# Patient Record
Sex: FEMALE | Race: WHITE | NOT HISPANIC OR LATINO | Employment: OTHER | URBAN - METROPOLITAN AREA
[De-identification: names, ages, dates, MRNs, and addresses within clinical notes are randomized per-mention and may not be internally consistent; named-entity substitution may affect disease eponyms.]

---

## 2017-01-09 ENCOUNTER — TELEPHONE (OUTPATIENT)
Dept: FAMILY MEDICINE | Facility: CLINIC | Age: 67
End: 2017-01-09

## 2017-01-09 NOTE — TELEPHONE ENCOUNTER
Pt has appt sched in February. Pt wants to know if you need labs prior to appt . Pt had labs done in August . Please advise.--lp

## 2017-01-09 NOTE — TELEPHONE ENCOUNTER
----- Message from RT Elan sent at 1/9/2017  9:35 AM CST -----  Contact: pt    pt , requesting labs test orders in prior the her appt of 02/14/2017, please call to confirm, thanks.

## 2017-01-16 RX ORDER — RIVAROXABAN 20 MG/1
TABLET, FILM COATED ORAL
Qty: 90 TABLET | Refills: 0 | Status: SHIPPED | OUTPATIENT
Start: 2017-01-16 | End: 2017-05-18 | Stop reason: SDUPTHER

## 2017-01-31 ENCOUNTER — DOCUMENTATION ONLY (OUTPATIENT)
Dept: ADMINISTRATIVE | Facility: HOSPITAL | Age: 67
End: 2017-01-31

## 2017-01-31 NOTE — PROGRESS NOTES
PRE-VISIT CHART REVIEW    Appointment Scheduled on 2/14/17    Department stratifications & guidelines reviewed:yes    Target Chronic Diagnosis: HTN, obesity    Chronic Diagnosis Intervention Due: no    Goals Updated:Yes    Health Maintenance Due   Topic Date Due    Pap Smear  06/19/2016    Influenza Vaccine  08/01/2016       Advanced Directives:   65 years of age or older?  No  Directive on file?  N\A                                      Pre-visit patient communication:  In Person    Studies or screenings scheduled pre-visit: no    Educate patient on obesity (37.31)

## 2017-02-14 ENCOUNTER — OFFICE VISIT (OUTPATIENT)
Dept: FAMILY MEDICINE | Facility: CLINIC | Age: 67
End: 2017-02-14
Payer: COMMERCIAL

## 2017-02-14 VITALS
HEIGHT: 66 IN | BODY MASS INDEX: 37.84 KG/M2 | DIASTOLIC BLOOD PRESSURE: 90 MMHG | SYSTOLIC BLOOD PRESSURE: 138 MMHG | WEIGHT: 235.44 LBS | TEMPERATURE: 99 F | HEART RATE: 86 BPM | RESPIRATION RATE: 12 BRPM

## 2017-02-14 DIAGNOSIS — Z01.419 WELL WOMAN EXAM WITH ROUTINE GYNECOLOGICAL EXAM: Primary | ICD-10-CM

## 2017-02-14 DIAGNOSIS — I48.0 PAROXYSMAL ATRIAL FIBRILLATION: ICD-10-CM

## 2017-02-14 DIAGNOSIS — Z13.820 SCREENING FOR OSTEOPOROSIS: ICD-10-CM

## 2017-02-14 DIAGNOSIS — D68.51 FACTOR V LEIDEN: ICD-10-CM

## 2017-02-14 DIAGNOSIS — Z12.31 ENCOUNTER FOR SCREENING MAMMOGRAM FOR MALIGNANT NEOPLASM OF BREAST: ICD-10-CM

## 2017-02-14 DIAGNOSIS — F41.1 GAD (GENERALIZED ANXIETY DISORDER): ICD-10-CM

## 2017-02-14 DIAGNOSIS — E78.5 HYPERLIPIDEMIA, UNSPECIFIED HYPERLIPIDEMIA TYPE: ICD-10-CM

## 2017-02-14 DIAGNOSIS — F33.1 MAJOR DEPRESSIVE DISORDER, RECURRENT, MODERATE: ICD-10-CM

## 2017-02-14 PROCEDURE — 82270 OCCULT BLOOD FECES: CPT | Mod: ,,, | Performed by: INTERNAL MEDICINE

## 2017-02-14 PROCEDURE — 3080F DIAST BP >= 90 MM HG: CPT | Mod: S$GLB,,, | Performed by: INTERNAL MEDICINE

## 2017-02-14 PROCEDURE — 88175 CYTOPATH C/V AUTO FLUID REDO: CPT

## 2017-02-14 PROCEDURE — 99396 PREV VISIT EST AGE 40-64: CPT | Mod: S$GLB,,, | Performed by: INTERNAL MEDICINE

## 2017-02-14 PROCEDURE — 3075F SYST BP GE 130 - 139MM HG: CPT | Mod: S$GLB,,, | Performed by: INTERNAL MEDICINE

## 2017-02-14 RX ORDER — CARVEDILOL PHOSPHATE 20 MG/1
20 CAPSULE, EXTENDED RELEASE ORAL DAILY
Qty: 90 CAPSULE | Refills: 5 | Status: SHIPPED | OUTPATIENT
Start: 2017-02-14 | End: 2018-01-25 | Stop reason: SDUPTHER

## 2017-02-14 RX ORDER — DESVENLAFAXINE 100 MG/1
100 TABLET, EXTENDED RELEASE ORAL DAILY
Qty: 90 TABLET | Refills: 3 | Status: SHIPPED | OUTPATIENT
Start: 2017-02-14 | End: 2018-01-25 | Stop reason: SDUPTHER

## 2017-02-14 RX ORDER — ATORVASTATIN CALCIUM 40 MG/1
40 TABLET, FILM COATED ORAL DAILY
Qty: 90 TABLET | Refills: 1 | Status: SHIPPED | OUTPATIENT
Start: 2017-02-14 | End: 2017-05-17 | Stop reason: SDUPTHER

## 2017-02-14 NOTE — TELEPHONE ENCOUNTER
Pended meds except for xarelto 20 mg due to it being a 90 day supply [facility].

## 2017-02-14 NOTE — MR AVS SNAPSHOT
[facility]  [address] [address] [address]  [facility] [address]  Phone: [phone]  Fax: [phone]                  Tina Polanco   2017 8:00 AM   Office Visit    Description:  Female : 1950   Provider:  Tomeka Coleman DO   Department:  [facility]           Reason for Visit     well women     Gynecologic Exam           Diagnoses this Visit        Comments    Well woman exam with routine gynecological exam    -  Primary     Paroxysmal atrial fibrillation         Hyperlipidemia, unspecified hyperlipidemia type         QUINTEN (generalized anxiety disorder)         Factor V Leiden         Screening for osteoporosis         Encounter for screening mammogram for malignant neoplasm of breast                To Do List           Goals (5 Years of Data)              Today    16    LDL CHOLESTEROL < 130       100.4    Weight < 150 lb (68.04 kg)   106.8 kg (235 lb 7.2 oz)  104.1 kg (229 lb 6.4 oz)        Follow-Up and Disposition     Return in about 6 months (around 2017) for chronic medical issues.      [facility]     [facility] On [facility] Nurse Care Line - 24/7 Assistance  Registered nurses in the [facility] provide clinical advisement, health education, appointment booking, and other advisory services.  Call for this free service at [phone].             Medications           Message regarding Medications     Verify the changes and/or additions to your medication regime listed below are the same as discussed with your clinician today.  If any of these changes or additions are incorrect, please notify your healthcare provider.        STOP taking these medications     ammonium lactate 12 % Crea Apply 1 application topically 2 (two) times daily.    L.acidoph & Ernestine.therm 175 mg Cap Take by mouth.    hydrOXYzine HCl (ATARAX) 25 MG tablet Take 1 tablet (25 mg total) by mouth 3 (three) times daily as needed for Itching.  "   fluticasone (FLONASE) 50 mcg/actuation nasal spray 2 sprays by Each Nare route once daily.    diphenhydrAMINE (BENADRYL) 25 mg capsule Take 25 mg by mouth every 6 (six) hours as needed for Itching.           Verify that the below list of medications is an accurate representation of the medications you are currently taking.  If none reported, the list may be blank. If incorrect, please contact your healthcare provider. Carry this list with you in case of emergency.           Current Medications     atorvastatin (LIPITOR) 40 MG tablet TAKE 1 TABLET DAILY    COREG CR 20 mg 24 hr capsule TAKE 1 CAPSULE DAILY    desvenlafaxine succinate (PRISTIQ) 100 MG Tb24 Take 100 mg by mouth once daily.    ERGOCALCIFEROL, VITAMIN D2, (VITAMIN D ORAL) Take 1 tablet by mouth once daily.    topiramate (TOPAMAX) 100 MG tablet TAKE 1 TABLET TWICE A DAY    XARELTO 20 mg Tab TAKE 1 TABLET DAILY    triamcinolone acetonide 0.5% (KENALOG) 0.5 % Crea Apply topically 2 (two) times daily.           Clinical Reference Information           Your Vitals Were     BP Pulse Temp Resp Height Weight    138/90 86 99.1 °F (37.3 °C) (Oral) 12 5' 5.75" (1.67 m) 106.8 kg (235 lb 7.2 oz)    BMI                38.29 kg/m2          Blood Pressure          Most Recent Value    BP  (!)  138/90      Allergies as of 2/14/2017     Mosquito Allergenic Extract    Codeine      Immunizations Administered on Date of Encounter - 2/14/2017     None      Orders Placed During Today's Visit      Normal Orders This Visit    DXA Bone Density Spine And Hip_Axial Skeleton     Liquid-based pap smear, screening     Mammo Digital Screening Bilat with CAD     Future Labs/Procedures Expected by Expires    CBC auto differential  8/1/2017 8/14/2018    Comprehensive metabolic panel  8/1/2017 8/14/2018    DXA Bone Density Spine And Hip_Axial Skeleton  8/1/2017 8/14/2018    Lipid panel  8/1/2017 8/14/2018    Mammo Digital Screening Bilat with CAD  8/1/2017 8/14/2018    TSH  8/1/2017 " 8/14/2018      Language Assistance Services     ATTENTION: Language assistance services are available, free of charge. Please call [phone].      ATENCIÓN: Si habla angela, tiene a alfred disposición servicios gratuitos de asistencia lingüística. Llame al [phone].     CHÚ Ý: N?u b?n nói Ti?ng Vi?t, có các d?ch v? h? tr? ngôn ng? mi?n phí dành cho b?n. G?i s? [phone].         [facility] complies with applicable Federal civil rights laws and does not discriminate on the basis of race, color, national origin, age, disability, or sex.

## 2017-02-14 NOTE — PROGRESS NOTES
Subjective:       Patient ID: Tina Polnaco is a 66 y.o. female.    Chief Complaint: well women and Gynecologic Exam    Tina Polanco is a 66 y.o. No obstetric history on file. who presents for an annual exam. The patient has no complaints today.   She is here today to f/u on chronic medical issues and for a pap.     She has a history of recurrent DVT in right leg with known Factor V Leiden deficiency. She is on chronic anticoagulation with xarelto. She is doing well and denies any acute symptoms. She continues to get increase swelling in right > left due to venous stasis. She wears compression stockings when she drives prolonged periods.       She was dx with paroxysmal Afib 10 years ago after taking cold medication. She was seen in urgent care for palpitations and was in Afib. She was started on metoprolol and she has not had an episode that she knows of since. She is anticoagulated with xarelto. She denies CAD. No chest pain or shortness of breath.       She has hyperlipidemia and is taking atorvastatin. Her lipids checked on week ago show 170/153/39/100 checked on 8/2016.      She has anxiety and depression for over 25 years. She originally was on prozac and then wellbutrin for years. For the last 6 years she has been doing very well on pristiq and topamax for mood. She denies any side effects. She does occasionally have flares of depression with crying spells but overall she feels she is well controlled. No suicidal ideations. She sleeps well at night. Good appetite.      She has seasonal allergies and is taking flonase and atarax. She denies any acute symptoms.     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 [place] it[place]      Colonoscopy---8/2014 Repeat in 5 years  Mammogram----8/2016 neg  DEXA-----6/2013 nl  Pap-----6/2013 neg  Tdap---1/2016  Influenza vaccine---1/2016  Pneumovax 23----8/2016  Prevnar  "13----never  Shingles vaccine-----3/2014    Last pap: 6/2013 neg  Pap results:  no abnormalities  Last mammogram: 8/2016   Mammogram results:   normal--routine follow-up in 12 months.  LMP: postmenopausal since age 61    Concerns  Vaginal discharge or odor: No  Abnormal bleeding: No  Pelvic Pain:  No  Sexually active:  Has some pain during sex but does not want to use topical estrogen cream  Pain during intercourse:  No  Birth Control:  No  Dysuria:  No  Incontinence:  No    Breast lump/bump: No  Breast pain:  No  Nipple discharge:  No  Breast skin changes: No     Menopausal symptoms:  No  Concerns for domestic abuse: No      Review of Systems   Constitutional: Negative for appetite change, fatigue, fever and unexpected weight change.   HENT: Negative for congestion, ear pain, hearing loss, sore throat and trouble swallowing.    Eyes: Negative for pain and visual disturbance.   Respiratory: Negative for cough, chest tightness, shortness of breath and wheezing.    Cardiovascular: Negative for chest pain, palpitations and leg swelling.   Gastrointestinal: Negative for abdominal pain, blood in stool, constipation, diarrhea, nausea and vomiting.   Endocrine: Negative for polyuria.   Genitourinary: Negative for dysuria and hematuria.   Musculoskeletal: Negative for arthralgias, back pain and myalgias.   Skin: Negative for rash.   Neurological: Negative for dizziness, weakness, numbness and headaches.   Hematological: Does not bruise/bleed easily.   Psychiatric/Behavioral: Negative for dysphoric mood, sleep disturbance and suicidal ideas. The patient is not nervous/anxious.        Objective:      Vitals:    02/14/17 0759   BP: (!) 138/90   Pulse: 86   Resp: 12   Temp: 99.1 °F (37.3 °C)   TempSrc: Oral   Weight: 106.8 kg (235 lb 7.2 oz)   Height: 5' 5.75" (1.67 m)     Physical Exam  General appearance: No acute distress, cooperative  General appearance: alert, no acute distress  Head: atraumatic  Eyes: PERRL, EMOI, normal " conjunctiva, no drainage  Ears: tm normal with good visualization of landmarks, no erythema or pus, canals normal, external ear normal  Nose: normal mucosa, no polyps or sores, no rhinorrhea  Throat: no erythema, no exudates, tonsils appear normal  Mouth: no sores or lesion, moist mucous membranes  Neck: FROM, soft, supple, no thyromegaly, no masses, no bruits  Lymph: no anterior or posterior cervical adenopathy, no axillary adenopathy  Heart::  Regular rate and rhythm, no murmur  Lung: Clear to ascultation bilaterally, no wheezing, no rales, no rhonchi, no distress  Breast: symmetric, no masses, no tenderness, no nipple discharge, nipples appear normal without inversion, no skin changes, no axillary adenopathy  Abdomen: Soft, nontender, no distention, no hepatosplenomegaly, bowel sounds normal, no guarding, no rebound, no peritoneal signs  Skin: no rashes, no lesions  :  Normal external female genitalia without lesions, normal vagina, cervix---no discharge, no lesions, no CMT, no adnexal masses or tenderness  Extremities: no edema  Peripheral pulses: 2+ pedal pulses bilaterally, good perfusion and color        Assessment:       1. Well woman exam with routine gynecological exam    2. Paroxysmal atrial fibrillation    3. Hyperlipidemia, unspecified hyperlipidemia type    4. QUINTEN (generalized anxiety disorder)    5. Factor V Leiden    6. Screening for osteoporosis    7. Encounter for screening mammogram for malignant neoplasm of breast        Plan:       Well woman exam with routine gynecological exam  Normal exam and will send for pap.  Mammogram is scheduled for her to get in 8/2017 (she is traveling so external rx given to get done where ever she is living).    -     Liquid-based pap smear, screening    Paroxysmal atrial fibrillation  NSR today and doing well. She continues on coreg and xarelto.    -     CBC auto differential; Future; Expected date: 8/1/17  -     Comprehensive metabolic panel; Future; Expected  date: 8/1/17  -     TSH; Future; Expected date: 8/1/17    Hyperlipidemia, unspecified hyperlipidemia type  Good control on atorvastatin. Will recheck lipids in 6 months.  Last LDL was 100 on 8/2017.    -     Lipid panel; Future; Expected date: 8/1/17    QUINTEN (generalized anxiety disorder)  Controlled on pristiq and topamax.  She is tolerating well without side effects.     Factor V Leiden  History of DVT in the past and on anticoagulation for Afib and factor V leiden.      Screening for osteoporosis  -     DXA Bone Density Spine And Hip_Axial Skeleton; Future; Expected date: 8/1/17    Encounter for screening mammogram for malignant neoplasm of breast  -     Mammo Digital Screening Bilat with CAD; Future; Expected date: 8/1/17    Return in about 6 months (around 8/14/2017) for chronic medical issues.

## 2017-02-14 NOTE — TELEPHONE ENCOUNTER
----- Message from Irasema Escobar sent at 2/14/2017  2:57 PM CST -----  Contact: self  Patient requesting refills on Pristiq 100 mg Xarelto 20 mg Coreg 20 mg and Atorvastatin 40 mg called to [facility] for a 90 day supply   If any questions please call    Thanks

## 2017-02-15 LAB
CTP QC/QA: YES
FECAL OCCULT BLOOD, POC: NEGATIVE

## 2017-02-20 ENCOUNTER — PATIENT MESSAGE (OUTPATIENT)
Dept: FAMILY MEDICINE | Facility: CLINIC | Age: 67
End: 2017-02-20

## 2017-03-20 ENCOUNTER — TELEPHONE (OUTPATIENT)
Dept: FAMILY MEDICINE | Facility: CLINIC | Age: 67
End: 2017-03-20

## 2017-03-20 NOTE — TELEPHONE ENCOUNTER
Attempted to reach patient via contact number provided, no answer. LM including contact information for return call.

## 2017-03-21 NOTE — TELEPHONE ENCOUNTER
Patient requesting clarification on which method to request med refills as she currently travels full time. Instructed patient to call, email or request refills via [employer], verbalized understanding.

## 2017-03-21 NOTE — TELEPHONE ENCOUNTER
----- Message from Glo Messer sent at 3/20/2017  2:05 PM CDT -----  Patient is returning nurses call, contact patient at [phone].      Thank you

## 2017-05-17 NOTE — TELEPHONE ENCOUNTER
Spoke to pt. States that when she came to [place] 2[place] she thought her scripts were 90 day supplies with 3 refills. Would like a refill on all medications with refills as she is traveling [place] the [place]. [facility] and I will call the patient back to notify her this was done. СВЕТЛАНА Jauregui LPN

## 2017-05-17 NOTE — TELEPHONE ENCOUNTER
----- Message from Juanis Owens sent at 5/17/2017  8:51 AM CDT -----  Contact: self  Patient needs a new prescription on Xaralto; Topomirate; Atorvastatin called into [facility] pharmacy at [phone]. Patient is out of Xaralto and needs the refill as soon as possible. Please call patient at [phone] when sent in or if you have any questions. Thanks!     [facility] - [address] [address]  Phone: [phone] Fax: [phone]

## 2017-05-18 NOTE — TELEPHONE ENCOUNTER
Pt just needs all [facility]. She has enough xarelto to last the week. She is traveling the [place] and needs a call when her scripts are sent to pharmacy. She needs to call pharmacy to make sure they send the scripts to her traveling address. I will call pt back when medications are sent electronically. Thank you. СВЕТЛАНА Jauregui LPN

## 2017-05-18 NOTE — TELEPHONE ENCOUNTER
Meds pended.    Also left message for pt to call us regarding a local rx for Xarelto. In message it states pt is out of Xarelto . Left message informing pt that it is important that she does not miss this medication .--lp

## 2017-05-18 NOTE — TELEPHONE ENCOUNTER
----- Message from Asuncion Rodriguez sent at 5/18/2017 11:00 AM CDT -----  Contact: self [phone]  Patient called asking for an update for three rx refills with [facility].Thanks!

## 2017-05-18 NOTE — TELEPHONE ENCOUNTER
Sent this message to MARTÍN Gutierrez on accident. This is a Dr. Cedillo's pt. Re routed this message to Dr. Coleman. СВЕТЛАНА Jauregui LPN

## 2017-05-19 RX ORDER — TOPIRAMATE 100 MG/1
100 TABLET, FILM COATED ORAL 2 TIMES DAILY
Qty: 180 TABLET | Refills: 3 | Status: SHIPPED | OUTPATIENT
Start: 2017-05-19 | End: 2018-01-25 | Stop reason: SDUPTHER

## 2017-05-19 RX ORDER — ATORVASTATIN CALCIUM 40 MG/1
40 TABLET, FILM COATED ORAL DAILY
Qty: 90 TABLET | Refills: 3 | Status: SHIPPED | OUTPATIENT
Start: 2017-05-19 | End: 2018-01-25 | Stop reason: SDUPTHER

## 2017-05-22 NOTE — TELEPHONE ENCOUNTER
----- Message from Lily Ramsay sent at 5/19/2017 12:37 PM CDT -----  Contact: patient  Patient calling to follow up on a new prescription for Xalrelto. She has also changed her Pharmacy. Please advise.  Call back  Thanks!  Adrienne in [place], [place]  Ph .

## 2017-05-22 NOTE — TELEPHONE ENCOUNTER
Returned pt call. Pt currently traveling in [place] and requesting refill on Xarelto. Pended 30 day supply to requested pharmacy. Please advise.

## 2017-11-17 ENCOUNTER — PATIENT OUTREACH (OUTPATIENT)
Dept: ADMINISTRATIVE | Facility: HOSPITAL | Age: 67
End: 2017-11-17

## 2017-12-04 ENCOUNTER — OFFICE VISIT (OUTPATIENT)
Dept: FAMILY MEDICINE | Facility: CLINIC | Age: 67
End: 2017-12-04
Payer: MEDICARE

## 2017-12-04 ENCOUNTER — PATIENT MESSAGE (OUTPATIENT)
Dept: FAMILY MEDICINE | Facility: CLINIC | Age: 67
End: 2017-12-04

## 2017-12-04 VITALS
SYSTOLIC BLOOD PRESSURE: 138 MMHG | HEART RATE: 78 BPM | HEIGHT: 66 IN | RESPIRATION RATE: 18 BRPM | BODY MASS INDEX: 37.56 KG/M2 | WEIGHT: 233.69 LBS | DIASTOLIC BLOOD PRESSURE: 70 MMHG | TEMPERATURE: 99 F

## 2017-12-04 DIAGNOSIS — A49.2 H. INFLUENZAE INFECTION: ICD-10-CM

## 2017-12-04 DIAGNOSIS — E78.5 HYPERLIPIDEMIA, UNSPECIFIED HYPERLIPIDEMIA TYPE: ICD-10-CM

## 2017-12-04 DIAGNOSIS — I87.2 VENOUS STASIS DERMATITIS OF RIGHT LOWER EXTREMITY: ICD-10-CM

## 2017-12-04 DIAGNOSIS — E66.9 OBESITY (BMI 35.0-39.9 WITHOUT COMORBIDITY): ICD-10-CM

## 2017-12-04 DIAGNOSIS — Z78.0 ASYMPTOMATIC MENOPAUSAL STATE: ICD-10-CM

## 2017-12-04 DIAGNOSIS — F41.1 GAD (GENERALIZED ANXIETY DISORDER): ICD-10-CM

## 2017-12-04 DIAGNOSIS — K21.9 GASTROESOPHAGEAL REFLUX DISEASE WITHOUT ESOPHAGITIS: Primary | ICD-10-CM

## 2017-12-04 DIAGNOSIS — Z23 NEED FOR INFLUENZA VACCINATION: ICD-10-CM

## 2017-12-04 DIAGNOSIS — Z23 NEED FOR PNEUMOCOCCAL VACCINATION: ICD-10-CM

## 2017-12-04 DIAGNOSIS — I48.0 PAROXYSMAL ATRIAL FIBRILLATION: ICD-10-CM

## 2017-12-04 DIAGNOSIS — Z12.31 ENCOUNTER FOR SCREENING MAMMOGRAM FOR MALIGNANT NEOPLASM OF BREAST: ICD-10-CM

## 2017-12-04 PROCEDURE — 99214 OFFICE O/P EST MOD 30 MIN: CPT | Mod: S$GLB,,, | Performed by: INTERNAL MEDICINE

## 2017-12-04 PROCEDURE — G0008 ADMIN INFLUENZA VIRUS VAC: HCPCS | Mod: S$GLB,,, | Performed by: INTERNAL MEDICINE

## 2017-12-04 PROCEDURE — 90662 IIV NO PRSV INCREASED AG IM: CPT | Mod: S$GLB,,, | Performed by: INTERNAL MEDICINE

## 2017-12-04 RX ORDER — OSELTAMIVIR PHOSPHATE 75 MG/1
CAPSULE ORAL
Refills: 0 | COMMUNITY
Start: 2017-12-01 | End: 2018-01-24 | Stop reason: ALTCHOICE

## 2017-12-04 NOTE — PROGRESS NOTES
Subjective:       Patient ID: Tina Polanco is a 67 y.o. female.    Medication List with Changes/Refills   New Medications    RANITIDINE (ZANTAC) 150 MG TABLET    Take 1 tablet (150 mg total) by mouth 2 (two) times daily as needed for Heartburn.   Current Medications    ATORVASTATIN (LIPITOR) 40 MG TABLET    Take 1 tablet (40 mg total) by mouth once daily.    CARVEDILOL PHOSPHATE 20 MG ORAL CM24 (COREG CR) 20 MG 24 HR CAPSULE    Take 1 capsule (20 mg total) by mouth once daily.    DESVENLAFAXINE SUCCINATE (PRISTIQ) 100 MG TB24    Take 100 mg by mouth once daily.    ERGOCALCIFEROL, VITAMIN D2, (VITAMIN D ORAL)    Take 1 tablet by mouth once daily.    LACTOBAC 40-BIFIDO 3-S.THERMOP (PROBIOTIC) 100 BILLION CELL CAP    Take by mouth.    OSELTAMIVIR (TAMIFLU) 75 MG CAPSULE        RIVAROXABAN (XARELTO) 20 MG TAB    Take 1 tablet (20 mg total) by mouth once daily.    TOPIRAMATE (TOPAMAX) 100 MG TABLET    Take 1 tablet (100 mg total) by mouth 2 (two) times daily.    TRIAMCINOLONE ACETONIDE 0.5% (KENALOG) 0.5 % CREA    Apply topically 2 (two) times daily.       Chief Complaint: Annual Exam (Physical, Pneum and flu shot maybe)  She is here today to f/u on chronic medical issues.     She was dx with influenza virus last week and was started on tamiflu. She is feeling much better now.  No fevers and only a mild cough with headache. She does have a fever blister.     She complains of severe pain in her esophagus/chest and vomiting only after eating tomatoes and pork fat. No abdominal pain and she does not have symptoms regularly.  She denies changes in her bowels, no diarrhea or blood in stool. She has chronic constipation that is diet controlled.  Her symptoms occur only with the above triggers.       She has a history of recurrent DVT in right leg with known Factor V Leiden deficiency. She is on chronic anticoagulation with xarelto. She is doing well and denies any acute symptoms. She has been wearing copper compression  stockings and has no swelling in her legs.         She was dx with paroxysmal Afib 10 years ago after taking cold medication. She was seen in urgent care for palpitations and was in Afib. She was started on metoprolol and she has not had an episode that she knows of since. She is anticoagulated with xarelto. She denies CAD. No chest pain or shortness of breath.       She has hyperlipidemia and is taking atorvastatin. Her lipids checked on week ago show 170/153/39/100 checked on 8/2016.      She has anxiety and depression for over 25 years. She originally was on prozac and then wellbutrin for years. For the last 6 years she has been doing very well on pristiq and topamax for mood. She denies any side effects. No suicidal ideations. She sleeps well at night. Good appetite.      She has seasonal allergies and is taking flonase and atarax. She denies any acute symptoms.      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. [place]      Colonoscopy---8/2014 Repeat in 5 years  Mammogram----8/2016 neg  DEXA-----6/2013 nl  Pap----2/2017 normal  Tdap---1/2016  Influenza vaccine---1/2016  Pneumovax 23----8/2016  Prevnar 13----never  Shingles vaccine-----3/2014    Review of Systems   Constitutional: Negative for appetite change, fatigue, fever and unexpected weight change.   HENT: Negative for congestion, ear pain, hearing loss, sore throat and trouble swallowing.    Eyes: Negative for pain and visual disturbance.   Respiratory: Negative for cough, chest tightness, shortness of breath and wheezing.    Cardiovascular: Negative for chest pain, palpitations and leg swelling.   Gastrointestinal: Positive for vomiting. Negative for abdominal pain, blood in stool, constipation, diarrhea and nausea.   Endocrine: Negative for polyuria.   Genitourinary: Negative for dysuria and hematuria.  "  Musculoskeletal: Positive for arthralgias. Negative for back pain and myalgias.   Skin: Negative for rash.   Neurological: Negative for dizziness, weakness, numbness and headaches.   Hematological: Does not bruise/bleed easily.   Psychiatric/Behavioral: Positive for sleep disturbance. Negative for dysphoric mood and suicidal ideas. The patient is nervous/anxious.        Objective:      Vitals:    12/04/17 0813   BP: 138/70   Pulse: 78   Resp: 18   Temp: 98.7 °F (37.1 °C)   TempSrc: Oral   Weight: 106 kg (233 lb 11 oz)   Height: 5' 5.5" (1.664 m)     Body mass index is 38.3 kg/m².  Physical Exam    General appearance: No acute distress, cooperative  Eyes: PERRL, EOMI, conjunctiva clear  Ears: normal external ear and pinna, tm clear without drainage, canals clear  Nose: Normal mucosa without drainage  Throat: no exudates or erythema, tonsils not enlarged  Mouth: no sores or lesions, moist mucous membranes, good dentition  Neck: FROM, soft, supple, no thyromegaly, no bruits  Lymph: no anterior or posterior cervical adenopathy  Heart::  Regular rate and rhythm, no murmur  Lung: Clear to ascultation bilaterally, no wheezing, no rales, no rhonchi, no distress  Abdomen: Soft, nontender, no distention, no hepatosplenomegaly, bowel sounds normal, no guarding, no rebound, no peritoneal signs  Skin: no rashes, no lesions  Extremities: no edema, no cyanosis  Neuro: CN 2-12 intact, 5/5 muscle strength upper and lower extremity bilaterally, 2+ DTRs UE and LE bilaterally, normal gait, normal sensation  Peripheral pulses: 2+ pedal pulses bilaterally, good perfusion and color  Musculoskeletal: FROM, good strenth, no tenderness  Joint: normal appearance, no swelling, no warmth, no deformity in all joints    Assessment:       1. Gastroesophageal reflux disease without esophagitis    2. Paroxysmal atrial fibrillation    3. Hyperlipidemia, unspecified hyperlipidemia type    4. QUINTEN (generalized anxiety disorder)    5. Venous stasis " dermatitis of right lower extremity    6. Obesity (BMI 35.0-39.9 without comorbidity)    7. H. influenzae infection    8. Encounter for screening mammogram for malignant neoplasm of breast    9. Need for influenza vaccination    10. Asymptomatic menopausal state    11. Need for pneumococcal vaccination        Plan:       Gastroesophageal reflux disease without esophagitis  Symptoms consistent with reflux. Will start ranitidine bid PRN as needed for heartburn.    -     ranitidine (ZANTAC) 150 MG tablet; Take 1 tablet (150 mg total) by mouth 2 (two) times daily as needed for Heartburn.  Dispense: 60 tablet; Refill: 11    Paroxysmal atrial fibrillation  NSR and continues on metoprolol and xarelto.     Hyperlipidemia, unspecified hyperlipidemia type  Good control but she is due to recheck lipids today.     QUINTEN (generalized anxiety disorder)  Controlled on pristiq and topamax.  She is tolerating well and no side effects.     Venous stasis dermatitis of right lower extremity  Controlled with the use of compression stockings.     Obesity (BMI 35.0-39.9 without comorbidity)  Encouraged regular exercise and healthy eating.     H. influenzae infection  Resolving infection. She is feeling much better. Advised to complete the course of tamiflu.     Encounter for screening mammogram for malignant neoplasm of breast  -     Mammo Digital Screening Bilat with CAD; Future; Expected date: 12/04/2017    Need for influenza vaccination  -     Influenza - High Dose (65+) (PF) (IM)    Asymptomatic menopausal state  -     DXA Bone Density Spine And Hip; Future; Expected date: 12/04/2017    Need for pneumococcal vaccination  -     Pneumococcal Conjugate Vaccine (13 Valent) (IM)    Return in about 6 months (around 6/4/2018) for chronic medical issues.

## 2017-12-12 ENCOUNTER — HOSPITAL ENCOUNTER (OUTPATIENT)
Dept: RADIOLOGY | Facility: HOSPITAL | Age: 67
Discharge: HOME | End: 2017-12-12
Attending: INTERNAL MEDICINE
Payer: MEDICARE

## 2017-12-12 DIAGNOSIS — Z12.31 ENCOUNTER FOR SCREENING MAMMOGRAM FOR MALIGNANT NEOPLASM OF BREAST: ICD-10-CM

## 2017-12-12 DIAGNOSIS — Z78.0 ASYMPTOMATIC MENOPAUSAL STATE: ICD-10-CM

## 2017-12-12 PROCEDURE — 77067 SCR MAMMO BI INCL CAD: CPT | Mod: 26,,, | Performed by: RADIOLOGY

## 2017-12-12 PROCEDURE — 77080 DXA BONE DENSITY AXIAL: CPT | Mod: TC,PO

## 2017-12-12 PROCEDURE — 77067 SCR MAMMO BI INCL CAD: CPT | Mod: TC,PO

## 2017-12-12 PROCEDURE — 77063 BREAST TOMOSYNTHESIS BI: CPT | Mod: 26,,, | Performed by: RADIOLOGY

## 2017-12-12 PROCEDURE — 77080 DXA BONE DENSITY AXIAL: CPT | Mod: 26,,, | Performed by: RADIOLOGY

## 2017-12-14 ENCOUNTER — PATIENT MESSAGE (OUTPATIENT)
Dept: FAMILY MEDICINE | Facility: CLINIC | Age: 67
End: 2017-12-14

## 2017-12-15 ENCOUNTER — PATIENT MESSAGE (OUTPATIENT)
Dept: FAMILY MEDICINE | Facility: CLINIC | Age: 67
End: 2017-12-15

## 2018-01-24 ENCOUNTER — OFFICE VISIT (OUTPATIENT)
Dept: FAMILY MEDICINE | Facility: CLINIC | Age: 68
End: 2018-01-24
Payer: MEDICARE

## 2018-01-24 VITALS
HEIGHT: 66 IN | DIASTOLIC BLOOD PRESSURE: 90 MMHG | WEIGHT: 242.94 LBS | BODY MASS INDEX: 39.04 KG/M2 | TEMPERATURE: 98 F | SYSTOLIC BLOOD PRESSURE: 138 MMHG | HEART RATE: 67 BPM | RESPIRATION RATE: 18 BRPM

## 2018-01-24 DIAGNOSIS — H65.01 RIGHT ACUTE SEROUS OTITIS MEDIA, RECURRENCE NOT SPECIFIED: Primary | ICD-10-CM

## 2018-01-24 PROCEDURE — 99213 OFFICE O/P EST LOW 20 MIN: CPT | Mod: PBBFAC,PO | Performed by: PHYSICIAN ASSISTANT

## 2018-01-24 PROCEDURE — 99999 PR PBB SHADOW E&M-EST. PATIENT-LVL III: CPT | Mod: PBBFAC,,, | Performed by: PHYSICIAN ASSISTANT

## 2018-01-24 PROCEDURE — 99214 OFFICE O/P EST MOD 30 MIN: CPT | Mod: S$PBB,,, | Performed by: PHYSICIAN ASSISTANT

## 2018-01-24 NOTE — PATIENT INSTRUCTIONS
Earache, No Infection (adult)  Take mucinex    Earaches can happen without an infection. This occurs when air and fluid build up behind the eardrum causing a feeling of fullness and discomfort and reduced hearing. This is called otitis media with effusion (OME) or serous otitis media. It means there is fluid in the middle ear. It is not the same as acute otitis media, which is typically from infection.  OME can happen when you have a cold if congestion blocks the passage that drains the middle ear. This passage is called the eustachian tube. OME may also occur with nasal allergies or after a bacterial middle ear infection.    The pain or discomfort may come and go. You may hear clicking or popping sounds when you chew or swallow. You may feel that your balance is off. Or you may hear ringing in the ear.  It often takes from several weeks up to 3 months for the fluid to clear on its own. Oral pain relievers and ear drops help if there is pain. Decongestants and antihistamines sometimes help. Antibiotics don't help since there is no infection. Your doctor may prescribe a nasal spray to help reduce swelling in the nose and eustachian tube. This can allow the ear to drain.  If your OME doesn't improve after 3 months, surgery may be used to drain the fluid and insert a small tube in the eardrum to allow continued drainage.  Because the middle ear fluid can become infected, it is important to watch for signs of an ear infection which may develop later. These signs include increased ear pain, fever, or drainage from the ear.  Home care  The following guidelines will help you care for yourself at home:  · You may use over-the-counter medicine as directed to control pain, unless another medicine was prescribed. If you have chronic liver or kidney disease or ever had a stomach ulcer or GI bleeding, talk with your doctor before using these medicines. Aspirin should never be used in anyone under 18 years of age who is ill  with a fever. It may cause severe liver damage.  · You may use over-the-counter decongestants such as phenylephrine or pseudoephedrine. But they are not always helpful. Don't use nasal spray decongestants more than 3 days. Longer use can make congestion worse. Prescription nasal sprays from your doctor don't typically have those restrictions.  · Antihistamines may help if you are also having allergy symptoms.  · You may use medicines such as guaifenesin to thin mucus and promote drainage.  Follow-up care  Follow up with your healthcare provider or as advised if you are not feeling better after 3 days.  When to seek medical advice  Call your healthcare provider right away if any of the following occur:  · Your ear pain gets worse or does not start to improve   · Fever of 100.4°F (38°C) or higher, or as directed by your healthcare provider  · Fluid or blood draining from the ear  · Headache or sinus pain  · Stiff neck  · Unusual drowsiness or confusion  Date Last Reviewed: 10/1/2016  © 1561-1873 [employer]. [address] [place] [address], [place] [address]. All rights reserved. This information is not intended as a substitute for professional medical care. Always follow your healthcare professional's instructions.

## 2018-01-25 DIAGNOSIS — K21.9 GASTROESOPHAGEAL REFLUX DISEASE WITHOUT ESOPHAGITIS: ICD-10-CM

## 2018-01-25 DIAGNOSIS — F33.1 MAJOR DEPRESSIVE DISORDER, RECURRENT, MODERATE: ICD-10-CM

## 2018-01-25 RX ORDER — ATORVASTATIN CALCIUM 40 MG/1
40 TABLET, FILM COATED ORAL DAILY
Qty: 90 TABLET | Refills: 3 | Status: SHIPPED | OUTPATIENT
Start: 2018-01-25 | End: 2019-01-28

## 2018-01-25 RX ORDER — CARVEDILOL PHOSPHATE 20 MG/1
20 CAPSULE, EXTENDED RELEASE ORAL DAILY
Qty: 90 CAPSULE | Refills: 3 | Status: SHIPPED | OUTPATIENT
Start: 2018-01-25 | End: 2018-05-10 | Stop reason: SDUPTHER

## 2018-01-25 RX ORDER — TOPIRAMATE 100 MG/1
100 TABLET, FILM COATED ORAL 2 TIMES DAILY
Qty: 180 TABLET | Refills: 3 | Status: SHIPPED | OUTPATIENT
Start: 2018-01-25 | End: 2019-01-28

## 2018-01-25 RX ORDER — DESVENLAFAXINE 100 MG/1
100 TABLET, EXTENDED RELEASE ORAL DAILY
Qty: 90 TABLET | Refills: 3 | Status: SHIPPED | OUTPATIENT
Start: 2018-01-25 | End: 2018-05-01 | Stop reason: SDUPTHER

## 2018-01-25 NOTE — PROGRESS NOTES
Subjective:       Patient ID: Tina Polanco is a 67 y.o. female    Chief Complaint: Otalgia    HPI  Mrs Polanco present today CO right ear pain that began this morning at 2:00am.  She treated it at home with olive oil in her ear and she reports feeling better now.  She denies any recent URI symptoms or hearing loss.      Review of Systems   HENT: Positive for ear pain. Negative for ear discharge, hearing loss, rhinorrhea and sore throat.    Respiratory: Negative for cough.    Gastrointestinal: Negative for abdominal pain, diarrhea and vomiting.   Musculoskeletal: Negative for neck pain.   Skin: Negative for rash.   Neurological: Negative for headaches.        Objective:   Physical Exam   Constitutional: She is oriented to person, place, and time. She appears well-developed and well-nourished.   HENT:   Head: Normocephalic and atraumatic.   Right Ear: External ear normal.   Left Ear: External ear normal.   Nose: Nose normal.   Mouth/Throat: Oropharynx is clear and moist. No oropharyngeal exudate.   Right TM is bulging and shows clear ear effusion.    Eyes: Conjunctivae and EOM are normal. Pupils are equal, round, and reactive to light.   Neck: Normal range of motion. Neck supple. No thyromegaly present.   Cardiovascular: Normal rate, regular rhythm, normal heart sounds and intact distal pulses.  Exam reveals no gallop and no friction rub.    No murmur heard.  Pulmonary/Chest: Effort normal and breath sounds normal. No respiratory distress. She has no wheezes. She has no rales.   Musculoskeletal: Normal range of motion. She exhibits no edema.   Lymphadenopathy:     She has no cervical adenopathy.   Neurological: She is alert and oriented to person, place, and time. She has normal reflexes.   Skin: Skin is warm and dry. No rash noted.   Psychiatric: She has a normal mood and affect. Her behavior is normal. Judgment and thought content normal.        Assessment:       1. Right acute serous otitis media,  recurrence not specified          Plan:       Right acute serous otitis media, recurrence not specified  - Mucinex BID  - take tylenol or Ibuprofen as needed for pain

## 2018-05-01 DIAGNOSIS — F33.1 MAJOR DEPRESSIVE DISORDER, RECURRENT, MODERATE: ICD-10-CM

## 2018-05-01 RX ORDER — TOPIRAMATE 100 MG/1
TABLET, FILM COATED ORAL
Qty: 180 TABLET | Refills: 3 | Status: SHIPPED | OUTPATIENT
Start: 2018-05-01 | End: 2019-01-28

## 2018-05-01 RX ORDER — ATORVASTATIN CALCIUM 40 MG/1
TABLET, FILM COATED ORAL
Qty: 90 TABLET | Refills: 3 | Status: SHIPPED | OUTPATIENT
Start: 2018-05-01 | End: 2019-01-28

## 2018-05-02 RX ORDER — DESVENLAFAXINE 100 MG/1
TABLET, EXTENDED RELEASE ORAL
Qty: 90 TABLET | Refills: 3 | Status: SHIPPED | OUTPATIENT
Start: 2018-05-02 | End: 2019-04-05 | Stop reason: SDUPTHER

## 2018-05-10 RX ORDER — CARVEDILOL PHOSPHATE 20 MG/1
CAPSULE, EXTENDED RELEASE ORAL
Qty: 90 CAPSULE | Refills: 5 | Status: SHIPPED | OUTPATIENT
Start: 2018-05-10 | End: 2019-01-28 | Stop reason: SDUPTHER

## 2018-10-10 NOTE — TELEPHONE ENCOUNTER
----- Message from Ronak Green sent at 10/10/2018 12:18 PM CDT -----  Contact: same  Type:  RX Refill Request    Who Called:  patient  Refill or New Rx:  refill  RX Name and Strength:  rivaroxaban (XARELTO) 20 mg Tab  How is the patient currently taking it? (ex. 1XDay):  Take 1 tablet (20 mg total) by mouth once daily  Is this a 30 day or 90 day RX:  90 tablets with 2 refills last filled on 1/25/18  Preferred Pharmacy with phone number:    [facility] [address] [address]  Phone: [phone] Fax: [phone]    Ordering Provider:  Jared Dias Call Back Number:  [phone]  Additional Information:  n/a

## 2018-10-29 DIAGNOSIS — K21.9 GASTROESOPHAGEAL REFLUX DISEASE WITHOUT ESOPHAGITIS: ICD-10-CM

## 2018-10-29 NOTE — TELEPHONE ENCOUNTER
----- Message from Angeline Charles sent at 10/29/2018  3:51 PM CDT -----  Contact: [facility] [place]   Type:  RX Refill Request    Who Called:  [facility] [place]   Refill or New Rx:  Refill  RX Name and Strength:      rivaroxaban (XARELTO) 20 mg Tab  ranitidine (ZANTAC) 150 MG tablet      Preferred Pharmacy with phone number:    [facility] [address] [facility]  [phone]  Local or Mail Order:  Out of State   Ordering Provider: Jared Dias Call Back Number:  [phone] (home)     Additional Information:

## 2019-01-14 NOTE — TELEPHONE ENCOUNTER
----- Message from Rupa Zapien sent at 1/14/2019  8:52 AM CST -----  Contact: Self  Type:  RX Refill Request    Who Called:  Patient  Refill or New Rx:  refill  RX Name and Strength:rivaroxaban (XARELTO) 20 mg Tab    How is the patient currently taking it? (ex. 1XDay):  1XDay  Is this a 30 day or 90 day RX:  90  Preferred Pharmacy with phone number:    [facility] - [address] [address]  Phone: [phone] Fax: [phone]  Local or Mail Order:  Mail Order   Ordering Provider:  Dr Jared Dias Call Back Number:  [phone] (home)   Additional Information:  [facility]

## 2019-01-15 NOTE — TELEPHONE ENCOUNTER
----- Message from Alize Beckman sent at 1/15/2019 12:55 PM CST -----  Contact: self  Patient [phone] is calling about the Xarelto medication/please call patient to discuss

## 2019-01-24 ENCOUNTER — TELEPHONE (OUTPATIENT)
Dept: FAMILY MEDICINE | Facility: CLINIC | Age: 69
End: 2019-01-24

## 2019-01-24 DIAGNOSIS — E78.00 PURE HYPERCHOLESTEROLEMIA: ICD-10-CM

## 2019-01-24 DIAGNOSIS — E55.9 VITAMIN D DEFICIENCY: ICD-10-CM

## 2019-01-24 DIAGNOSIS — I48.0 PAROXYSMAL ATRIAL FIBRILLATION: Primary | ICD-10-CM

## 2019-01-25 NOTE — TELEPHONE ENCOUNTER
I have openings on those days. Please schedule her on one of those days and for fasting labs prior.     Thanks

## 2019-01-28 DIAGNOSIS — I48.0 PAROXYSMAL ATRIAL FIBRILLATION: Primary | ICD-10-CM

## 2019-01-28 DIAGNOSIS — F41.9 ANXIETY: ICD-10-CM

## 2019-01-28 DIAGNOSIS — E78.00 PURE HYPERCHOLESTEROLEMIA: ICD-10-CM

## 2019-01-28 RX ORDER — TOPIRAMATE 100 MG/1
100 TABLET, FILM COATED ORAL 2 TIMES DAILY
Qty: 180 TABLET | Refills: 3 | Status: SHIPPED | OUTPATIENT
Start: 2019-01-28 | End: 2020-01-10

## 2019-01-28 RX ORDER — ATORVASTATIN CALCIUM 40 MG/1
40 TABLET, FILM COATED ORAL DAILY
Qty: 90 TABLET | Refills: 3 | Status: SHIPPED | OUTPATIENT
Start: 2019-01-28 | End: 2020-01-10

## 2019-01-28 RX ORDER — CARVEDILOL PHOSPHATE 20 MG/1
20 CAPSULE, EXTENDED RELEASE ORAL DAILY
Qty: 90 CAPSULE | Refills: 3 | Status: SHIPPED | OUTPATIENT
Start: 2019-01-28 | End: 2019-12-31

## 2019-01-28 RX ORDER — ATORVASTATIN CALCIUM 40 MG/1
40 TABLET, FILM COATED ORAL DAILY
Qty: 10 TABLET | Refills: 0 | Status: SHIPPED | OUTPATIENT
Start: 2019-01-28 | End: 2019-02-13 | Stop reason: SDUPTHER

## 2019-01-28 RX ORDER — CARVEDILOL PHOSPHATE 20 MG/1
20 CAPSULE, EXTENDED RELEASE ORAL DAILY
Qty: 10 CAPSULE | Refills: 5 | Status: SHIPPED | OUTPATIENT
Start: 2019-01-28 | End: 2019-02-13 | Stop reason: SDUPTHER

## 2019-01-28 RX ORDER — TOPIRAMATE 100 MG/1
100 TABLET, FILM COATED ORAL 2 TIMES DAILY
Qty: 20 TABLET | Refills: 3 | Status: SHIPPED | OUTPATIENT
Start: 2019-01-28 | End: 2019-02-13 | Stop reason: SDUPTHER

## 2019-01-28 NOTE — TELEPHONE ENCOUNTER
"----- Message from Alize Beckman sent at 1/26/2019 10:30 AM CST -----  Contact: self  Patient [phone] is calling as her prescriptions were sent to a [facility] somewhere that she has never heard of - per [facility]/patient states "this has been a big mess" with [facility] advised her today 01 26 19 to call her doctor and have new 90 day prescriptions sent to [facility] and also send a one week prescription to [facility] in [facility] ([phone])/The prescriptions are: atorvastatin (LIPITOR) 40 MG tablet - take one tablet by mouth daily / CARVEDILOL PHOSPHATE 20 MG ORAL CM24 - takes 1 capsule daily / topiramate (TOPAMAX) 100 MG tablet - takes one tablet twice daily/please call patient to discuss  "

## 2019-01-30 ENCOUNTER — PATIENT OUTREACH (OUTPATIENT)
Dept: ADMINISTRATIVE | Facility: HOSPITAL | Age: 69
End: 2019-01-30

## 2019-01-30 DIAGNOSIS — Z12.39 BREAST CANCER SCREENING: Primary | ICD-10-CM

## 2019-01-30 NOTE — PROGRESS NOTES
Health Maintenance Due   Topic Date Due    Pneumococcal Vaccine (65+ Low/Medium Risk) (1 of 2 - PCV13) 08/11/2017    Influenza Vaccine  08/01/2018    Mammogram  12/12/2018

## 2019-02-07 ENCOUNTER — PATIENT OUTREACH (OUTPATIENT)
Dept: ADMINISTRATIVE | Facility: HOSPITAL | Age: 69
End: 2019-02-07

## 2019-02-07 NOTE — LETTER
February 7, 2019    Tina Polanco  [address]  [place] [address]             [facility]  [address]  [facility] [address]  Phone: [phone] Dear Ms. Polanco:    We have tried to reach you by [facility] email unsuccessfully.     [facility] is committed to your overall health.  To help you get the most out of each of your visits, we will review your information to make sure you are up to date on all of your recommended tests and/or procedures.       Tomeka Coleman DO   has found that your chart shows you may be due for the following:     Pneumonia Immunization   Influenza Vaccine   Mammogram     If you have had any of the above done at another facility, please bring the records or information with you so that your record at [facility] will be complete. If you have not had any of these tests or procedures done recently and would like to complete this testing ,  please call [phone] or send a message through your [facility] portal to your provider's office.     If you have an upcoming scheduled appointment for the above test and/or procedures, please disregard this letter.     If you are currently taking medication, please bring it with you to your appointment for review.     Sincerely,    DO Kim Marquez  Clinical Care Coordinator  [facility] [address] [facility] [address]  [place] [address]  Phone: [phone]   Fax: [phone]

## 2019-02-07 NOTE — PROGRESS NOTES
Health Maintenance Due   Topic Date Due    Pneumococcal Vaccine (65+ Low/Medium Risk) (1 of 2 - PCV13) 08/11/2017    Influenza Vaccine  08/01/2018    Mammogram  12/12/2018     Portal outreach un-read by patient.  Outreach mailed today

## 2019-02-12 ENCOUNTER — LAB VISIT (OUTPATIENT)
Dept: LAB | Facility: HOSPITAL | Age: 69
End: 2019-02-12
Attending: INTERNAL MEDICINE
Payer: MEDICARE

## 2019-02-12 DIAGNOSIS — E78.00 PURE HYPERCHOLESTEROLEMIA: ICD-10-CM

## 2019-02-12 DIAGNOSIS — I48.0 PAROXYSMAL ATRIAL FIBRILLATION: ICD-10-CM

## 2019-02-12 DIAGNOSIS — E55.9 VITAMIN D DEFICIENCY: ICD-10-CM

## 2019-02-12 LAB
25(OH)D3+25(OH)D2 SERPL-MCNC: 18 NG/ML
ALBUMIN SERPL BCP-MCNC: 3.7 G/DL
ALP SERPL-CCNC: 79 U/L
ALT SERPL W/O P-5'-P-CCNC: 15 U/L
ANION GAP SERPL CALC-SCNC: 5 MMOL/L
AST SERPL-CCNC: 14 U/L
BASOPHILS # BLD AUTO: 0.04 K/UL
BASOPHILS NFR BLD: 1.1 %
BILIRUB SERPL-MCNC: 0.5 MG/DL
BUN SERPL-MCNC: 15 MG/DL
CALCIUM SERPL-MCNC: 9.1 MG/DL
CHLORIDE SERPL-SCNC: 113 MMOL/L
CHOLEST SERPL-MCNC: 170 MG/DL
CHOLEST/HDLC SERPL: 3.5 {RATIO}
CO2 SERPL-SCNC: 23 MMOL/L
CREAT SERPL-MCNC: 0.9 MG/DL
DIFFERENTIAL METHOD: ABNORMAL
EOSINOPHIL # BLD AUTO: 0.2 K/UL
EOSINOPHIL NFR BLD: 4.9 %
ERYTHROCYTE [DISTWIDTH] IN BLOOD BY AUTOMATED COUNT: 12.9 %
EST. GFR  (AFRICAN AMERICAN): >60 ML/MIN/1.73 M^2
EST. GFR  (NON AFRICAN AMERICAN): >60 ML/MIN/1.73 M^2
GLUCOSE SERPL-MCNC: 106 MG/DL
HCT VFR BLD AUTO: 46.2 %
HDLC SERPL-MCNC: 48 MG/DL
HDLC SERPL: 28.2 %
HGB BLD-MCNC: 15.2 G/DL
IMM GRANULOCYTES # BLD AUTO: 0 K/UL
IMM GRANULOCYTES NFR BLD AUTO: 0 %
LDLC SERPL CALC-MCNC: 101.8 MG/DL
LYMPHOCYTES # BLD AUTO: 1.1 K/UL
LYMPHOCYTES NFR BLD: 31.1 %
MCH RBC QN AUTO: 31.2 PG
MCHC RBC AUTO-ENTMCNC: 32.9 G/DL
MCV RBC AUTO: 95 FL
MONOCYTES # BLD AUTO: 0.4 K/UL
MONOCYTES NFR BLD: 10.4 %
NEUTROPHILS # BLD AUTO: 1.9 K/UL
NEUTROPHILS NFR BLD: 52.5 %
NONHDLC SERPL-MCNC: 122 MG/DL
NRBC BLD-RTO: 0 /100 WBC
PLATELET # BLD AUTO: 167 K/UL
PMV BLD AUTO: 11.1 FL
POTASSIUM SERPL-SCNC: 3.9 MMOL/L
PROT SERPL-MCNC: 7.5 G/DL
RBC # BLD AUTO: 4.87 M/UL
SODIUM SERPL-SCNC: 141 MMOL/L
TRIGL SERPL-MCNC: 101 MG/DL
TSH SERPL DL<=0.005 MIU/L-ACNC: 1.39 UIU/ML
WBC # BLD AUTO: 3.66 K/UL

## 2019-02-12 PROCEDURE — 36415 COLL VENOUS BLD VENIPUNCTURE: CPT | Mod: PO

## 2019-02-12 PROCEDURE — 85025 COMPLETE CBC W/AUTO DIFF WBC: CPT

## 2019-02-12 PROCEDURE — 82306 VITAMIN D 25 HYDROXY: CPT

## 2019-02-12 PROCEDURE — 80061 LIPID PANEL: CPT

## 2019-02-12 PROCEDURE — 80053 COMPREHEN METABOLIC PANEL: CPT

## 2019-02-12 PROCEDURE — 84443 ASSAY THYROID STIM HORMONE: CPT

## 2019-02-13 ENCOUNTER — OFFICE VISIT (OUTPATIENT)
Dept: FAMILY MEDICINE | Facility: CLINIC | Age: 69
End: 2019-02-13
Payer: MEDICARE

## 2019-02-13 VITALS
HEART RATE: 78 BPM | WEIGHT: 224.19 LBS | HEIGHT: 66 IN | DIASTOLIC BLOOD PRESSURE: 82 MMHG | TEMPERATURE: 99 F | OXYGEN SATURATION: 96 % | SYSTOLIC BLOOD PRESSURE: 120 MMHG | BODY MASS INDEX: 36.03 KG/M2

## 2019-02-13 DIAGNOSIS — Z23 NEED FOR SHINGLES VACCINE: ICD-10-CM

## 2019-02-13 DIAGNOSIS — Z23 NEED FOR PROPHYLACTIC VACCINATION AGAINST STREPTOCOCCUS PNEUMONIAE (PNEUMOCOCCUS): ICD-10-CM

## 2019-02-13 DIAGNOSIS — E78.5 HYPERLIPIDEMIA, UNSPECIFIED HYPERLIPIDEMIA TYPE: ICD-10-CM

## 2019-02-13 DIAGNOSIS — Z86.718 HISTORY OF RECURRENT DEEP VEIN THROMBOSIS: ICD-10-CM

## 2019-02-13 DIAGNOSIS — Z23 NEED FOR IMMUNIZATION AGAINST INFLUENZA: ICD-10-CM

## 2019-02-13 DIAGNOSIS — F41.1 GAD (GENERALIZED ANXIETY DISORDER): ICD-10-CM

## 2019-02-13 DIAGNOSIS — F33.1 MAJOR DEPRESSIVE DISORDER, RECURRENT, MODERATE: ICD-10-CM

## 2019-02-13 DIAGNOSIS — D68.51 HETEROZYGOUS FACTOR V LEIDEN MUTATION: ICD-10-CM

## 2019-02-13 DIAGNOSIS — E55.9 VITAMIN D DEFICIENCY: ICD-10-CM

## 2019-02-13 DIAGNOSIS — Z12.31 ENCOUNTER FOR SCREENING MAMMOGRAM FOR MALIGNANT NEOPLASM OF BREAST: ICD-10-CM

## 2019-02-13 DIAGNOSIS — I10 ESSENTIAL HYPERTENSION: ICD-10-CM

## 2019-02-13 DIAGNOSIS — K21.9 GASTROESOPHAGEAL REFLUX DISEASE WITHOUT ESOPHAGITIS: ICD-10-CM

## 2019-02-13 DIAGNOSIS — J30.9 ALLERGIC RHINITIS, UNSPECIFIED SEASONALITY, UNSPECIFIED TRIGGER: ICD-10-CM

## 2019-02-13 DIAGNOSIS — D70.9 NEUTROPENIA, UNSPECIFIED TYPE: ICD-10-CM

## 2019-02-13 DIAGNOSIS — E66.01 CLASS 2 SEVERE OBESITY DUE TO EXCESS CALORIES WITH SERIOUS COMORBIDITY AND BODY MASS INDEX (BMI) OF 36.0 TO 36.9 IN ADULT: ICD-10-CM

## 2019-02-13 DIAGNOSIS — I48.0 PAROXYSMAL ATRIAL FIBRILLATION: Primary | ICD-10-CM

## 2019-02-13 PROCEDURE — 99214 PR OFFICE/OUTPT VISIT, EST, LEVL IV, 30-39 MIN: ICD-10-PCS | Mod: 25,S$GLB,, | Performed by: INTERNAL MEDICINE

## 2019-02-13 PROCEDURE — 90662 IIV NO PRSV INCREASED AG IM: CPT | Mod: S$GLB,,, | Performed by: INTERNAL MEDICINE

## 2019-02-13 PROCEDURE — 90670 PCV13 VACCINE IM: CPT | Mod: S$GLB,,, | Performed by: INTERNAL MEDICINE

## 2019-02-13 PROCEDURE — G0008 FLU VACCINE - HIGH DOSE (65+) PRESERVATIVE FREE IM: ICD-10-PCS | Mod: S$GLB,,, | Performed by: INTERNAL MEDICINE

## 2019-02-13 PROCEDURE — G0009 ADMIN PNEUMOCOCCAL VACCINE: HCPCS | Mod: S$GLB,,, | Performed by: INTERNAL MEDICINE

## 2019-02-13 PROCEDURE — 90670 PNEUMOCOCCAL CONJUGATE VACCINE 13-VALENT LESS THAN 5YO & GREATER THAN: ICD-10-PCS | Mod: S$GLB,,, | Performed by: INTERNAL MEDICINE

## 2019-02-13 PROCEDURE — 99214 OFFICE O/P EST MOD 30 MIN: CPT | Mod: 25,S$GLB,, | Performed by: INTERNAL MEDICINE

## 2019-02-13 PROCEDURE — 90662 FLU VACCINE - HIGH DOSE (65+) PRESERVATIVE FREE IM: ICD-10-PCS | Mod: S$GLB,,, | Performed by: INTERNAL MEDICINE

## 2019-02-13 PROCEDURE — G0009 PNEUMOCOCCAL CONJUGATE VACCINE 13-VALENT LESS THAN 5YO & GREATER THAN: ICD-10-PCS | Mod: S$GLB,,, | Performed by: INTERNAL MEDICINE

## 2019-02-13 PROCEDURE — G0008 ADMIN INFLUENZA VIRUS VAC: HCPCS | Mod: S$GLB,,, | Performed by: INTERNAL MEDICINE

## 2019-02-13 RX ORDER — ERGOCALCIFEROL 1.25 MG/1
50000 CAPSULE ORAL
Qty: 12 CAPSULE | Refills: 3 | Status: SHIPPED | OUTPATIENT
Start: 2019-02-13 | End: 2019-12-26

## 2019-02-13 NOTE — PROGRESS NOTES
Subjective:       Patient ID: Tina Polanco is a 68 y.o. female.    Medication List with Changes/Refills   New Medications    ERGOCALCIFEROL (ERGOCALCIFEROL) 50,000 UNIT CAP    Take 1 capsule (50,000 Units total) by mouth every 7 days.    VARICELLA-ZOSTER GE-AS01B, PF, (SHINGRIX, PF,) 50 MCG/0.5 ML INJECTION    Inject 0.5 mLs into the muscle once. for 1 dose   Current Medications    ATORVASTATIN (LIPITOR) 40 MG TABLET    Take 1 tablet (40 mg total) by mouth once daily.    CARVEDILOL PHOSPHATE 20 MG ORAL CM24 (COREG CR) 20 MG 24 HR CAPSULE    Take 1 capsule (20 mg total) by mouth once daily.    DESVENLAFAXINE SUCCINATE (PRISTIQ) 100 MG TB24    TAKE 1 TABLET DAILY    LACTOBAC 40-BIFIDO 3-S.THERMOP (PROBIOTIC) 100 BILLION CELL CAP    Take by mouth.    RIVAROXABAN (XARELTO) 20 MG TAB    Take 1 tablet (20 mg total) by mouth once daily.    TOPIRAMATE (TOPAMAX) 100 MG TABLET    Take 1 tablet (100 mg total) by mouth 2 (two) times daily.    TRIAMCINOLONE ACETONIDE 0.5% (KENALOG) 0.5 % CREA    Apply topically 2 (two) times daily.   Changed and/or Refilled Medications    Modified Medication Previous Medication    RANITIDINE (ZANTAC) 150 MG TABLET ranitidine (ZANTAC) 150 MG tablet       Take 1 tablet (150 mg total) by mouth 2 (two) times daily as needed for Heartburn.    Take 1 tablet (150 mg total) by mouth 2 (two) times daily as needed for Heartburn.   Discontinued Medications    ATORVASTATIN (LIPITOR) 40 MG TABLET    Take 1 tablet (40 mg total) by mouth once daily.    CARVEDILOL PHOSPHATE 20 MG ORAL CM24 (COREG CR) 20 MG 24 HR CAPSULE    Take 1 capsule (20 mg total) by mouth once daily.    ERGOCALCIFEROL, VITAMIN D2, (VITAMIN D ORAL)    Take 1 tablet by mouth once daily.    TOPIRAMATE (TOPAMAX) 100 MG TABLET    Take 1 tablet (100 mg total) by mouth 2 (two) times daily.       Chief Complaint: Follow-up  She is here today to f/u on chronic medical issues.        She was dx with paroxysmal Afib 10 years ago after taking  cold medication. She was seen in urgent care for palpitations and was in Afib. She is taking coreg xr 20 mg qday and denies any palpitations. She is anticoagulated with xarelto. She denies CAD. No chest pain or shortness of breath.       She has hyperlipidemia and is taking atorvastatin. Her lipids checked on 2/2019 were 170/101/48/101.      She has a history of recurrent DVT in right leg with known Factor V Leiden deficiency. She is on chronic anticoagulation with xarelto. She is doing well and denies any acute symptoms. She has been wearing copper compression stockings and has no swelling in her legs.      She has GERD and is taking ranitidine 150 mg bid as needed. She says she only uses for when she eats spicy foods.      She has anxiety and depression for over 25 years. She originally was on prozac and then wellbutrin for years. For the last 6 years she has been doing very well on pristiq and topamax for mood. She denies any side effects. No suicidal ideations. She sleeps well at night. Good appetite.  She is recently  from her  but she is doing very well (he yelled and insulted her frequently).  She feels safe on her own.      She recently had a vitamin D level checked that was low at 18 on 2/2019. She is taking daily OTC replacement only.     On her recent labs on 2/2019 she was noted to have a mildly low WBC of 3.66. This is a new finding. She denies any recent infections.      She has seasonal allergies and is taking flonase and atarax. She denies any acute symptoms.      She lives alone and feels safe. No falls or balance issues.  She is  from her .  She does not exercise but is trying to eat healthy.       Colonoscopy---8/2014 Repeat in 5 years  Mammogram----12/2017 neg  DEXA----12/2017 nl  Pap----2/2017 normal  Tdap---1/2016  Influenza vaccine-- due  Pneumovax 23----8/2016  Prevnar 13----never  Shingles vaccine-----3/2014    Review of Systems   Constitutional: Negative for  "appetite change, fatigue, fever and unexpected weight change.   HENT: Negative for congestion, ear pain, hearing loss, sore throat and trouble swallowing.    Eyes: Negative for pain and visual disturbance.   Respiratory: Negative for cough, chest tightness, shortness of breath and wheezing.    Cardiovascular: Negative for chest pain, palpitations and leg swelling.   Gastrointestinal: Negative for abdominal pain, blood in stool, constipation, diarrhea, nausea and vomiting.   Endocrine: Negative for polyuria.   Genitourinary: Negative for dysuria and hematuria.   Musculoskeletal: Positive for arthralgias (knees). Negative for back pain and myalgias.   Skin: Negative for rash.   Neurological: Negative for dizziness, weakness, numbness and headaches.   Hematological: Does not bruise/bleed easily.   Psychiatric/Behavioral: Negative for dysphoric mood, sleep disturbance and suicidal ideas. The patient is not nervous/anxious.        Objective:      Vitals:    02/13/19 0857   BP: 120/82   Pulse: 78   Temp: 98.8 °F (37.1 °C)   TempSrc: Oral   SpO2: 96%   Weight: 101.7 kg (224 lb 3.2 oz)   Height: 5' 5.5" (1.664 m)     Body mass index is 36.74 kg/m².  Physical Exam    General appearance: No acute distress, cooperative  Neck: FROM, soft, supple, no thyromegaly, no bruits  Lymph: no anterior or posterior cervical adenopathy  Heart::  Regular rate and rhythm, no murmur  Lung: Clear to ascultation bilaterally, no wheezing, no rales, no rhonchi, no distress  Abdomen: Soft, nontender, no distention, no hepatosplenomegaly, bowel sounds normal, no guarding, no rebound, no peritoneal signs  Skin: no rashes, no lesions  Extremities: trace edema b/l, no cyanosis  Neuro: no focal abnormalities, strength 5/5 b/l UE and LE, 2+ DTRs b/l UE and LE, normal gait  Peripheral pulses: 2+ pedal pulses bilaterally, good perfusion and color  Musculoskeletal: FROM, good strenth, no tenderness  Joint: normal appearance, no swelling, no warmth, no " deformity in all joints    Assessment:       1. Paroxysmal atrial fibrillation    2. Essential hypertension    3. Hyperlipidemia, unspecified hyperlipidemia type    4. History of recurrent deep vein thrombosis    5. Heterozygous factor V Leiden mutation    6. Neutropenia, unspecified type    7. Vitamin D deficiency    8. Major depressive disorder, recurrent, moderate    9. QUINTEN (generalized anxiety disorder)    10. Gastroesophageal reflux disease without esophagitis    11. Allergic rhinitis, unspecified seasonality, unspecified trigger    12. Class 2 severe obesity due to excess calories with serious comorbidity and body mass index (BMI) of 36.0 to 36.9 in adult    13. Encounter for screening mammogram for malignant neoplasm of breast    14. Need for immunization against influenza    15. Need for prophylactic vaccination against Streptococcus pneumoniae (pneumococcus)    16. Need for shingles vaccine        Plan:       Paroxysmal atrial fibrillation  NSR today and continue xarelto and coreg.     Essential hypertension  Good control on coreg.     Hyperlipidemia, unspecified hyperlipidemia type  Good control on atorvastatin daily. She is anticoagulated with xarelto.     History of recurrent deep vein thrombosis with Heterozygous factor V Leiden mutation  Continue lifelong anticoagulation.     Neutropenia, unspecified type  New finding and reassurance given. Will recheck in 6 weeks.   -     CBC auto differential; Future; Expected date: 02/13/2019    Vitamin D deficiency  Noted to be low and will start weekly high dose supplementation.   -     ergocalciferol (ERGOCALCIFEROL) 50,000 unit Cap; Take 1 capsule (50,000 Units total) by mouth every 7 days.  Dispense: 12 capsule; Refill: 3    Major depressive disorder, recurrent, moderate with QUINTEN (generalized anxiety disorder)  Controlled on pristiq and topamax. She is handling her separation well.      Gastroesophageal reflux disease without esophagitis  Good control on PRN  ranitidine.   -     ranitidine (ZANTAC) 150 MG tablet; Take 1 tablet (150 mg total) by mouth 2 (two) times daily as needed for Heartburn.  Dispense: 180 tablet; Refill: 1    Allergic rhinitis, unspecified seasonality, unspecified trigger  No active symptoms.     Class 2 severe obesity due to excess calories with serious comorbidity and body mass index (BMI) of 36.0 to 36.9 in adult  Long discussion on need for healthy eating and need for regular exercise.     Encounter for screening mammogram for malignant neoplasm of breast  -     Mammo Digital Screening Bilat; Future; Expected date: 02/13/2019    Need for immunization against influenza  -     Influenza - High Dose (65+) (PF) (IM)    Need for prophylactic vaccination against Streptococcus pneumoniae (pneumococcus)  -     Pneumococcal Conjugate Vaccine (13 Valent) (IM)    Need for shingles vaccine  -     varicella-zoster gE-AS01B, PF, (SHINGRIX, PF,) 50 mcg/0.5 mL injection; Inject 0.5 mLs into the muscle once. for 1 dose  Dispense: 0.5 mL; Refill: 1    Follow-up in about 6 months (around 8/13/2019) for chronic medical issues.

## 2019-04-03 ENCOUNTER — TELEPHONE (OUTPATIENT)
Dept: FAMILY MEDICINE | Facility: CLINIC | Age: 69
End: 2019-04-03

## 2019-04-03 DIAGNOSIS — I48.0 PAROXYSMAL ATRIAL FIBRILLATION: Primary | ICD-10-CM

## 2019-04-03 DIAGNOSIS — F33.1 MAJOR DEPRESSIVE DISORDER, RECURRENT, MODERATE: ICD-10-CM

## 2019-04-03 NOTE — TELEPHONE ENCOUNTER
----- Message from Ines Izaguirre sent at 4/3/2019  9:02 AM CDT -----  Contact: Patient  Type: Needs Medical Advice    Who Called:  Patient  Best Call Back Number: [phone] (home)   Additional Information: Pt need a permission slip for her to go to a gym she is still waiting on the form it was faxed to Dr office last week and the gym is still waiting on it please call her today please.

## 2019-04-05 RX ORDER — DESVENLAFAXINE 100 MG/1
100 TABLET, EXTENDED RELEASE ORAL DAILY
Qty: 90 TABLET | Refills: 3 | Status: SHIPPED | OUTPATIENT
Start: 2019-04-05

## 2019-04-05 NOTE — TELEPHONE ENCOUNTER
----- Message from Glo Valiente sent at 4/5/2019  1:38 PM CDT -----  Type: Needs Medication ordered    Who Called:  Patient  Best Call Back Number: [phone]  Additional Information: Patient calling back requesting another medication: desvenlafaxine succinate (PRISTIQ) 100 MG Tb24 be sent to [facility] with doctor's fax and phone number included.

## 2019-05-13 ENCOUNTER — LAB VISIT (OUTPATIENT)
Dept: LAB | Facility: HOSPITAL | Age: 69
End: 2019-05-13
Attending: INTERNAL MEDICINE
Payer: MEDICARE

## 2019-05-13 DIAGNOSIS — D70.9 NEUTROPENIA, UNSPECIFIED TYPE: ICD-10-CM

## 2019-05-13 LAB
BASOPHILS # BLD AUTO: 0.05 K/UL (ref 0–0.2)
BASOPHILS NFR BLD: 1.5 % (ref 0–1.9)
DIFFERENTIAL METHOD: ABNORMAL
EOSINOPHIL # BLD AUTO: 0.1 K/UL (ref 0–0.5)
EOSINOPHIL NFR BLD: 4 % (ref 0–8)
ERYTHROCYTE [DISTWIDTH] IN BLOOD BY AUTOMATED COUNT: 12.7 % (ref 11.5–14.5)
HCT VFR BLD AUTO: 46.3 % (ref 37–48.5)
HGB BLD-MCNC: 14.9 G/DL (ref 12–16)
IMM GRANULOCYTES # BLD AUTO: 0.01 K/UL (ref 0–0.04)
IMM GRANULOCYTES NFR BLD AUTO: 0.3 % (ref 0–0.5)
LYMPHOCYTES # BLD AUTO: 1 K/UL (ref 1–4.8)
LYMPHOCYTES NFR BLD: 30.5 % (ref 18–48)
MCH RBC QN AUTO: 31.6 PG (ref 27–31)
MCHC RBC AUTO-ENTMCNC: 32.2 G/DL (ref 32–36)
MCV RBC AUTO: 98 FL (ref 82–98)
MONOCYTES # BLD AUTO: 0.3 K/UL (ref 0.3–1)
MONOCYTES NFR BLD: 10.2 % (ref 4–15)
NEUTROPHILS # BLD AUTO: 1.7 K/UL (ref 1.8–7.7)
NEUTROPHILS NFR BLD: 53.5 % (ref 38–73)
NRBC BLD-RTO: 0 /100 WBC
PLATELET # BLD AUTO: 173 K/UL (ref 150–350)
PMV BLD AUTO: 11.8 FL (ref 9.2–12.9)
RBC # BLD AUTO: 4.72 M/UL (ref 4–5.4)
WBC # BLD AUTO: 3.25 K/UL (ref 3.9–12.7)

## 2019-05-13 PROCEDURE — 85025 COMPLETE CBC W/AUTO DIFF WBC: CPT

## 2019-05-13 PROCEDURE — 36415 COLL VENOUS BLD VENIPUNCTURE: CPT | Mod: PO

## 2019-05-14 ENCOUNTER — TELEPHONE (OUTPATIENT)
Dept: FAMILY MEDICINE | Facility: CLINIC | Age: 69
End: 2019-05-14

## 2019-05-15 ENCOUNTER — PATIENT MESSAGE (OUTPATIENT)
Dept: FAMILY MEDICINE | Facility: CLINIC | Age: 69
End: 2019-05-15

## 2019-05-23 ENCOUNTER — HOSPITAL ENCOUNTER (OUTPATIENT)
Dept: RADIOLOGY | Facility: HOSPITAL | Age: 69
Discharge: HOME | End: 2019-05-23
Attending: INTERNAL MEDICINE
Payer: MEDICARE

## 2019-05-23 DIAGNOSIS — Z12.31 ENCOUNTER FOR SCREENING MAMMOGRAM FOR MALIGNANT NEOPLASM OF BREAST: ICD-10-CM

## 2019-05-23 PROCEDURE — 77067 SCR MAMMO BI INCL CAD: CPT | Mod: TC,PO

## 2019-05-23 PROCEDURE — 77067 MAMMO DIGITAL SCREENING BILAT WITH TOMOSYNTHESIS_CAD: ICD-10-PCS | Mod: 26,,, | Performed by: RADIOLOGY

## 2019-05-23 PROCEDURE — 77063 MAMMO DIGITAL SCREENING BILAT WITH TOMOSYNTHESIS_CAD: ICD-10-PCS | Mod: 26,,, | Performed by: RADIOLOGY

## 2019-05-23 PROCEDURE — 77063 BREAST TOMOSYNTHESIS BI: CPT | Mod: 26,,, | Performed by: RADIOLOGY

## 2019-05-23 PROCEDURE — 77067 SCR MAMMO BI INCL CAD: CPT | Mod: 26,,, | Performed by: RADIOLOGY

## 2019-07-22 DIAGNOSIS — K21.9 GASTROESOPHAGEAL REFLUX DISEASE WITHOUT ESOPHAGITIS: ICD-10-CM

## 2019-10-22 ENCOUNTER — TELEPHONE (OUTPATIENT)
Dept: FAMILY MEDICINE | Facility: CLINIC | Age: 69
End: 2019-10-22

## 2019-10-22 RX ORDER — FAMOTIDINE 20 MG/1
20 TABLET, FILM COATED ORAL 2 TIMES DAILY
Qty: 180 TABLET | Refills: 3 | Status: SHIPPED | OUTPATIENT
Start: 2019-10-22 | End: 2020-09-28

## 2019-10-22 NOTE — TELEPHONE ENCOUNTER
Patient states she takes ranitidine tabs twice daily and has seen advertisements that there is a recall/alert regarding product contamination/cancer causing agents. Patient has requested provider send in a new prescription to mail order pharmacy. Please advise.

## 2019-10-22 NOTE — TELEPHONE ENCOUNTER
----- Message from Nena Whittington sent at 10/22/2019 10:18 AM CDT -----  Contact: patient  Type: Needs Medical Advice    Who Called:  patient  Symptoms (please be specific):  na  How long has patient had these symptoms:  helen  Pharmacy name and phone #:  [facility]  Best Call Back Number: [phone]  Additional Information: Patient states to please call her back she would like to discuss taking Zantac.  Ana Maria call to advise.Thanks!  .

## 2019-12-24 DIAGNOSIS — E55.9 VITAMIN D DEFICIENCY: ICD-10-CM

## 2019-12-26 RX ORDER — ERGOCALCIFEROL 1.25 MG/1
CAPSULE ORAL
Qty: 12 CAPSULE | Refills: 4 | Status: SHIPPED | OUTPATIENT
Start: 2019-12-26 | End: 2021-02-20

## 2019-12-30 DIAGNOSIS — I48.0 PAROXYSMAL ATRIAL FIBRILLATION: ICD-10-CM

## 2019-12-31 RX ORDER — CARVEDILOL PHOSPHATE 20 MG/1
CAPSULE, EXTENDED RELEASE ORAL
Qty: 90 CAPSULE | Refills: 4 | Status: SHIPPED | OUTPATIENT
Start: 2019-12-31

## 2020-01-09 DIAGNOSIS — E78.00 PURE HYPERCHOLESTEROLEMIA: ICD-10-CM

## 2020-01-09 DIAGNOSIS — F41.9 ANXIETY: ICD-10-CM

## 2020-01-10 RX ORDER — TOPIRAMATE 100 MG/1
TABLET, FILM COATED ORAL
Qty: 180 TABLET | Refills: 4 | Status: SHIPPED | OUTPATIENT
Start: 2020-01-10

## 2020-01-10 RX ORDER — ATORVASTATIN CALCIUM 40 MG/1
TABLET, FILM COATED ORAL
Qty: 90 TABLET | Refills: 4 | Status: SHIPPED | OUTPATIENT
Start: 2020-01-10 | End: 2021-02-18

## 2020-03-14 DIAGNOSIS — I48.0 PAROXYSMAL ATRIAL FIBRILLATION: ICD-10-CM

## 2020-03-15 RX ORDER — RIVAROXABAN 20 MG/1
TABLET, FILM COATED ORAL
Qty: 90 TABLET | Refills: 3 | Status: SHIPPED | OUTPATIENT
Start: 2020-03-15 | End: 2021-03-11

## 2020-05-05 ENCOUNTER — PATIENT MESSAGE (OUTPATIENT)
Dept: ADMINISTRATIVE | Facility: HOSPITAL | Age: 70
End: 2020-05-05

## 2020-07-17 DIAGNOSIS — Z71.89 COMPLEX CARE COORDINATION: ICD-10-CM

## 2020-09-28 RX ORDER — FAMOTIDINE 20 MG/1
TABLET, FILM COATED ORAL
Qty: 180 TABLET | Refills: 3 | Status: SHIPPED | OUTPATIENT
Start: 2020-09-28

## 2020-10-05 ENCOUNTER — PATIENT MESSAGE (OUTPATIENT)
Dept: ADMINISTRATIVE | Facility: HOSPITAL | Age: 70
End: 2020-10-05

## 2021-01-04 ENCOUNTER — PATIENT MESSAGE (OUTPATIENT)
Dept: ADMINISTRATIVE | Facility: HOSPITAL | Age: 71
End: 2021-01-04

## 2021-03-09 DIAGNOSIS — I48.0 PAROXYSMAL ATRIAL FIBRILLATION: ICD-10-CM

## 2021-03-11 RX ORDER — RIVAROXABAN 20 MG/1
TABLET, FILM COATED ORAL
Qty: 90 TABLET | Refills: 3 | Status: SHIPPED | OUTPATIENT
Start: 2021-03-11

## 2021-07-07 ENCOUNTER — PATIENT MESSAGE (OUTPATIENT)
Dept: ADMINISTRATIVE | Facility: HOSPITAL | Age: 71
End: 2021-07-07

## 2022-03-12 RX ORDER — FAMOTIDINE 20 MG/1
TABLET, FILM COATED ORAL
Qty: 180 TABLET | Refills: 3 | OUTPATIENT
Start: 2022-03-12

## 2022-03-12 NOTE — TELEPHONE ENCOUNTER

## 2022-03-12 NOTE — TELEPHONE ENCOUNTER
Care Due:                  Date            Visit Type   Department     Provider  --------------------------------------------------------------------------------    Last Visit: None Found      None         None Found  Next Visit: None Scheduled  None         None Found                                                            Last  Test          Frequency    Reason                     Performed    Due Date  --------------------------------------------------------------------------------    Office Visit  12 months..  atorvastatin.............  Not Found    Overdue    CMP.........  12 months..  atorvastatin.............  Not Found    Overdue    Lipid Panel.  12 months..  atorvastatin.............  Not Found    Overdue    Powered by [employer]. Reference number: [id number].   3/12/2022 10:16:02 AM CST

## 2022-05-12 DIAGNOSIS — E78.00 PURE HYPERCHOLESTEROLEMIA: ICD-10-CM

## 2022-05-13 RX ORDER — ATORVASTATIN CALCIUM 40 MG/1
TABLET, FILM COATED ORAL
Qty: 90 TABLET | Refills: 3 | OUTPATIENT
Start: 2022-05-13

## 2022-05-13 NOTE — TELEPHONE ENCOUNTER
Refill Routing Note   Medication(s) are not appropriate for processing by [facility] for the following reason(s):      - Unclear if patient follows with you     [facility] action(s):  Defer          Medication reconciliation completed: No     Appointments  past 12m or future 3m with PCP    Date Provider   Last Visit   2/13/2019 Tomeka Coleman, DO   Next Visit   Visit date not found Tomeka Coleman, DO   ED visits in past 90 days: 0        Note composed:11:41 PM 05/12/2022

## 2024-05-21 NOTE — TELEPHONE ENCOUNTER
----- Message from Jairo Merino sent at 1/28/2019  1:12 PM CST -----  Contact: Patient  Type: Needs Medical Advice    Who Called:  Patient  Pharmacy name and phone #:    [facility] - [address] [address]  Phone: [phone] Fax: [phone]  Best Call Back Number: [phone]  Additional Information: Patient received call from [facility] stating she should call her doctor because they are sending her prescriptions to a [facility]. Please advise patient     Taking iron supplement daily and bariatric fusion with iron